# Patient Record
Sex: MALE | Race: BLACK OR AFRICAN AMERICAN | NOT HISPANIC OR LATINO | URBAN - METROPOLITAN AREA
[De-identification: names, ages, dates, MRNs, and addresses within clinical notes are randomized per-mention and may not be internally consistent; named-entity substitution may affect disease eponyms.]

---

## 2024-11-21 ENCOUNTER — OFFICE VISIT (OUTPATIENT)
Dept: URGENT CARE | Age: 34
End: 2024-11-21
Payer: COMMERCIAL

## 2024-11-21 VITALS
RESPIRATION RATE: 16 BRPM | BODY MASS INDEX: 21.11 KG/M2 | DIASTOLIC BLOOD PRESSURE: 92 MMHG | OXYGEN SATURATION: 95 % | TEMPERATURE: 97.9 F | HEART RATE: 85 BPM | WEIGHT: 160 LBS | SYSTOLIC BLOOD PRESSURE: 137 MMHG

## 2024-11-21 DIAGNOSIS — H02.843 PAIN AND SWELLING OF EYELID OF RIGHT EYE: ICD-10-CM

## 2024-11-21 DIAGNOSIS — H00.022 HORDEOLUM INTERNUM OF RIGHT LOWER EYELID: Primary | ICD-10-CM

## 2024-11-21 DIAGNOSIS — H02.89 PAIN AND SWELLING OF EYELID OF RIGHT EYE: ICD-10-CM

## 2024-11-21 PROCEDURE — 99213 OFFICE O/P EST LOW 20 MIN: CPT | Performed by: FAMILY MEDICINE

## 2024-11-21 RX ORDER — CEPHALEXIN 500 MG/1
500 CAPSULE ORAL 2 TIMES DAILY
Qty: 14 CAPSULE | Refills: 0 | Status: SHIPPED | OUTPATIENT
Start: 2024-11-21 | End: 2024-11-28

## 2024-11-21 RX ORDER — ERYTHROMYCIN 5 MG/G
OINTMENT OPHTHALMIC EVERY 6 HOURS
Qty: 3.5 G | Refills: 0 | Status: SHIPPED | OUTPATIENT
Start: 2024-11-21 | End: 2024-11-28

## 2024-11-21 NOTE — PROGRESS NOTES
HPI:  Patient states that since yesterday he has pain/swelling/redness in his right lower eyelid.  No hx/o injury.  +eye irritation.  No light sensitivity. No blurry vision.  Pt does not wear contacts or glasses.      ROS:  No fever/chills  No blurry vision  No HA  No dizziness    PE:    A&O x3  NCAT  PERRLA, EOMI, mild right conjunctival erythema, +swelling/redness/tndop w/o fluctuance on the right lower eyelid medially around eyelash and tear duct  TM clear bl  No pharyngeal erythema  RRR  CTAB  MOEx4  No focal deficit  Judgement normal    A/P:   Right eyelid sty  Right eyelid swelling    Warm compress to the eyelid.  Avoid rubbing eyes.  Eat yogurt and take probiotics when on medication.  Tylenol and Motrin as needed for pain.  Keep a diary of symptoms.  Recheck with eye doctor in 7 days if no improvement.  Go to the ER if starts getting worse.